# Patient Record
Sex: MALE | Race: WHITE | Employment: FULL TIME | URBAN - METROPOLITAN AREA
[De-identification: names, ages, dates, MRNs, and addresses within clinical notes are randomized per-mention and may not be internally consistent; named-entity substitution may affect disease eponyms.]

---

## 2021-10-01 ENCOUNTER — HOSPITAL ENCOUNTER (OUTPATIENT)
Dept: ULTRASOUND IMAGING | Age: 46
Discharge: HOME | End: 2021-10-01
Payer: COMMERCIAL

## 2021-10-01 DIAGNOSIS — M79.661 RIGHT CALF PAIN: ICD-10-CM

## 2021-10-01 PROCEDURE — 93971 EXTREMITY STUDY: CPT

## 2024-06-05 ENCOUNTER — HOSPITAL ENCOUNTER (EMERGENCY)
Age: 49
Discharge: HOME | End: 2024-06-05
Attending: STUDENT IN AN ORGANIZED HEALTH CARE EDUCATION/TRAINING PROGRAM
Payer: COMMERCIAL

## 2024-06-05 ENCOUNTER — APPOINTMENT (OUTPATIENT)
Dept: GENERAL RADIOLOGY | Age: 49
End: 2024-06-05
Payer: COMMERCIAL

## 2024-06-05 VITALS
WEIGHT: 211.6 LBS | RESPIRATION RATE: 16 BRPM | HEART RATE: 83 BPM | HEIGHT: 70 IN | TEMPERATURE: 97.6 F | OXYGEN SATURATION: 97 % | DIASTOLIC BLOOD PRESSURE: 94 MMHG | SYSTOLIC BLOOD PRESSURE: 143 MMHG | BODY MASS INDEX: 30.29 KG/M2

## 2024-06-05 DIAGNOSIS — S52.124A CLOSED NONDISPLACED FRACTURE OF HEAD OF RIGHT RADIUS, INITIAL ENCOUNTER: ICD-10-CM

## 2024-06-05 DIAGNOSIS — S53.104A CLOSED DISLOCATION OF RIGHT ELBOW, INITIAL ENCOUNTER: Primary | ICD-10-CM

## 2024-06-05 PROCEDURE — 99152 MOD SED SAME PHYS/QHP 5/>YRS: CPT

## 2024-06-05 PROCEDURE — 73070 X-RAY EXAM OF ELBOW: CPT

## 2024-06-05 PROCEDURE — 6360000002 HC RX W HCPCS: Performed by: STUDENT IN AN ORGANIZED HEALTH CARE EDUCATION/TRAINING PROGRAM

## 2024-06-05 PROCEDURE — 6360000002 HC RX W HCPCS: Performed by: NURSE PRACTITIONER

## 2024-06-05 PROCEDURE — 99285 EMERGENCY DEPT VISIT HI MDM: CPT

## 2024-06-05 PROCEDURE — 6370000000 HC RX 637 (ALT 250 FOR IP): Performed by: NURSE PRACTITIONER

## 2024-06-05 PROCEDURE — 2580000003 HC RX 258: Performed by: NURSE PRACTITIONER

## 2024-06-05 PROCEDURE — 24600 TX CLSD ELBOW DISLC W/O ANES: CPT

## 2024-06-05 RX ORDER — OXYCODONE AND ACETAMINOPHEN 7.5; 325 MG/1; MG/1
1 TABLET ORAL EVERY 6 HOURS PRN
Qty: 15 TABLET | Refills: 0 | Status: SHIPPED | OUTPATIENT
Start: 2024-06-05 | End: 2024-06-09

## 2024-06-05 RX ORDER — IBUPROFEN 600 MG/1
600 TABLET ORAL 3 TIMES DAILY PRN
Qty: 30 TABLET | Refills: 0 | Status: SHIPPED | OUTPATIENT
Start: 2024-06-05

## 2024-06-05 RX ORDER — 0.9 % SODIUM CHLORIDE 0.9 %
1000 INTRAVENOUS SOLUTION INTRAVENOUS ONCE
Status: DISCONTINUED | OUTPATIENT
Start: 2024-06-05 | End: 2024-06-05

## 2024-06-05 RX ORDER — OXYCODONE HYDROCHLORIDE AND ACETAMINOPHEN 5; 325 MG/1; MG/1
1 TABLET ORAL ONCE
Status: DISCONTINUED | OUTPATIENT
Start: 2024-06-05 | End: 2024-06-05

## 2024-06-05 RX ORDER — 0.9 % SODIUM CHLORIDE 0.9 %
1000 INTRAVENOUS SOLUTION INTRAVENOUS ONCE
Status: COMPLETED | OUTPATIENT
Start: 2024-06-05 | End: 2024-06-05

## 2024-06-05 RX ORDER — OXYCODONE AND ACETAMINOPHEN 7.5; 325 MG/1; MG/1
1 TABLET ORAL ONCE
Status: COMPLETED | OUTPATIENT
Start: 2024-06-05 | End: 2024-06-05

## 2024-06-05 RX ORDER — OMEPRAZOLE 20 MG/1
20 CAPSULE, DELAYED RELEASE ORAL DAILY
COMMUNITY

## 2024-06-05 RX ADMIN — HYDROMORPHONE HYDROCHLORIDE 1 MG: 1 INJECTION, SOLUTION INTRAMUSCULAR; INTRAVENOUS; SUBCUTANEOUS at 20:23

## 2024-06-05 RX ADMIN — OXYCODONE AND ACETAMINOPHEN 1 TABLET: 7.5; 325 TABLET ORAL at 22:51

## 2024-06-05 RX ADMIN — SODIUM CHLORIDE 1000 ML: 9 INJECTION, SOLUTION INTRAVENOUS at 21:43

## 2024-06-05 RX ADMIN — HYDROMORPHONE HYDROCHLORIDE 1 MG: 1 INJECTION, SOLUTION INTRAMUSCULAR; INTRAVENOUS; SUBCUTANEOUS at 20:42

## 2024-06-05 RX ADMIN — PROPOFOL 180 MG: 10 INJECTION, EMULSION INTRAVENOUS at 21:53

## 2024-06-05 ASSESSMENT — PAIN DESCRIPTION - LOCATION
LOCATION: ARM

## 2024-06-05 ASSESSMENT — PAIN DESCRIPTION - ORIENTATION
ORIENTATION: RIGHT

## 2024-06-05 ASSESSMENT — PAIN SCALES - GENERAL
PAINLEVEL_OUTOF10: 8
PAINLEVEL_OUTOF10: 9
PAINLEVEL_OUTOF10: 10
PAINLEVEL_OUTOF10: 9
PAINLEVEL_OUTOF10: 6

## 2024-06-05 ASSESSMENT — PAIN DESCRIPTION - DESCRIPTORS: DESCRIPTORS: ACHING

## 2024-06-06 NOTE — ED NOTES
Procedural sedation for rt elbow reduction completed. Prior to sedation, consent signed and time out completed. Vitals and med administration chart. Pt tolerated procedure well; is now alert and talking. Md okay with pt to have water. Pt tolerating water well so far.

## 2024-06-06 NOTE — DISCHARGE INSTR - COC
Continuity of Care Form    Patient Name: Basil Olson   :  1975  MRN:  [id number]    Admit date:  2024  Discharge date:  ***    Code Status Order: No Order   Advance Directives:     Admitting Physician:  No admitting provider for patient encounter.  PCP: No primary care provider on file.    Discharging Nurse: ***  Discharging Hospital Unit/Room#: MICHELLE-05/MICHELLE-5  Discharging Unit Phone Number: ***    Emergency Contact:   Extended Emergency Contact Information  Primary Emergency Contact: Sherrill Olson  Home Phone: [phone]  Relation: None    Past Surgical History:  Past Surgical History:   Procedure Laterality Date    HERNIA REPAIR         Immunization History:   Immunization History   Administered Date(s) Administered    COVID-19, J&J, (age 18y+), IM, 0.5 mL 2021       Active Problems:  There is no problem list on file for this patient.      Isolation/Infection:   Isolation            No Isolation          Patient Infection Status       None to display            Nurse Assessment:  Last Vital Signs: BP (!) 143/94   Pulse 83   Temp 97.6 °F (36.4 °C) (Oral)   Resp 16   Ht 1.778 m (5' 10\")   Wt 96 kg (211 lb 9.6 oz)   SpO2 97%   BMI 30.36 kg/m²     Last documented pain score (0-10 scale): Pain Level: 6  Last Weight:   Wt Readings from Last 1 Encounters:   24 96 kg (211 lb 9.6 oz)     Mental Status:  {IP PT MENTAL STATUS:69470}    IV Access:  { DAVE IV ACCESS:898717176}    Nursing Mobility/ADLs:  Walking   {CHP DME ADLs:146659363}  Transfer  {CHP DME ADLs:093563893}  Bathing  {CHP DME ADLs:953171932}  Dressing  {CHP DME ADLs:456465889}  Toileting  {CHP DME ADLs:658023318}  Feeding  {CHP DME ADLs:650032056}  Med Admin  {CHP DME ADLs:802996901}  Med Delivery   { DAVE MED Delivery:561883823}    Wound Care Documentation and Therapy:        Elimination:  Continence:   Bowel: {YES / NO:}  Bladder: {YES / NO:}  Urinary Catheter: {Urinary Catheter:774239496}

## 2024-06-06 NOTE — ED PROVIDER NOTES
[facility] EMERGENCY DEPARTMENT  EMERGENCY DEPARTMENT ENCOUNTER        Pt Name: Basil Olson  MRN: [id number]  Birthdate 1975  Date of evaluation: 6/5/2024  Provider: NICOLA CANO - CNP  PCP: No primary care provider on file.     I have seen and evaluated this patient with my supervising physician Dr.Barklay Knapp  Triage CHIEF COMPLAINT       Chief Complaint   Patient presents with    Arm Injury     Right, given 100mcg Fentanyl         HISTORY OF PRESENT ILLNESS      Chief Complaint: right elbow injury    Basil Olson is a 48 y.o. male who presents for evaluation of right elbow injury after he had picked up his son on the baseball field, starting walking and then tripped and fell.  He believes he landed on the right side.  Reports some numbness and paresthesias of the hand. Was transported in by EMS, given 100mcg Fentanyl and this helped short term but pain is now 10/10.  Denies any other injuries.    Nursing Notes were all reviewed and agreed with or any disagreements were addressed in the HPI.    REVIEW OF SYSTEMS     Pertinent ROS as noted in HPI.      PAST MEDICAL HISTORY     Past Medical History:   Diagnosis Date    Acid reflux        SURGICAL HISTORY     Past Surgical History:   Procedure Laterality Date    HERNIA REPAIR         CURRENTMEDICATIONS       Previous Medications    OMEPRAZOLE (PRILOSEC) 20 MG DELAYED RELEASE CAPSULE    Take 1 capsule by mouth daily       ALLERGIES     Patient has no known allergies.    FAMILYHISTORY     History reviewed. No pertinent family history.     SOCIAL HISTORY       Social History     Socioeconomic History    Marital status:      Spouse name: None    Number of children: None    Years of education: None    Highest education level: None   Tobacco Use    Smoking status: Never    Smokeless tobacco: Never   Substance and Sexual Activity    Alcohol use: Never    Drug use: Never       SCREENINGS    [place]

## 2024-06-06 NOTE — ED PROVIDER NOTES
EMERGENCY DEPARTMENT HISTORY AND PHYSICAL EXAM      Patient Name: Basil Olson  MRN: [id number]  : 1975  Date of Evaluation: 2024  ED Provider:  Puma Knapp DO    History of Presenting Illness     Chief Complaint:   Chief Complaint   Patient presents with    Arm Injury     Right, given 100mcg Fentanyl       THIS IS MY VIRGIL SUPERVISORY AND SHARED VISIT NOTE    I independently examined and evaluated Basil Olson.    In brief, patient presenting the emergency department following an injury to his right upper extremity with significant range of motion deficit and gross deformity at the elbow.    Focused exam revealed inability to flex or extend at the elbow with severe pain.  There is gross deformity of the elbow.  2+ radial pulses.  Upper extremities neurovascularly intact.        Past History     Past Medical History:   Past Medical History:   Diagnosis Date    Acid reflux      Surgical History:   Past Surgical History:   Procedure Laterality Date    HERNIA REPAIR       Family History: History reviewed. No pertinent family history.  Social History:   Social History     Socioeconomic History    Marital status:      Spouse name: Not on file    Number of children: Not on file    Years of education: Not on file    Highest education level: Not on file   Occupational History    Not on file   Tobacco Use    Smoking status: Never    Smokeless tobacco: Never   Vaping Use    Vaping Use: Not on file   Substance and Sexual Activity    Alcohol use: Never    Drug use: Never    Sexual activity: Not on file   Other Topics Concern    Not on file   Social History Narrative    Not on file     Social Determinants of Health     Financial Resource Strain: Not on file   Food Insecurity: Not on file   Transportation Needs: Not on file   Physical Activity: Not on file   Stress: Not on file   Social Connections: Not on file   Intimate Partner Violence: Not on file   Housing Stability: Not on file     Medications: